# Patient Record
Sex: FEMALE | Race: WHITE | NOT HISPANIC OR LATINO
[De-identification: names, ages, dates, MRNs, and addresses within clinical notes are randomized per-mention and may not be internally consistent; named-entity substitution may affect disease eponyms.]

---

## 2017-11-06 ENCOUNTER — HOSPITAL (OUTPATIENT)
Dept: OTHER | Age: 52
End: 2017-11-06
Attending: FAMILY MEDICINE

## 2017-11-22 ENCOUNTER — HOSPITAL ENCOUNTER (OUTPATIENT)
Age: 52
Discharge: HOME | End: 2017-11-22
Attending: FAMILY MEDICINE
Payer: COMMERCIAL

## 2017-11-22 VITALS
DIASTOLIC BLOOD PRESSURE: 62 MMHG | RESPIRATION RATE: 20 BRPM | HEART RATE: 73 BPM | OXYGEN SATURATION: 100 % | SYSTOLIC BLOOD PRESSURE: 113 MMHG | TEMPERATURE: 98 F

## 2017-11-22 DIAGNOSIS — H00.014 HORDEOLUM EXTERNUM OF LEFT UPPER EYELID: Primary | ICD-10-CM

## 2017-11-22 PROCEDURE — 99204 OFFICE O/P NEW MOD 45 MIN: CPT

## 2017-11-22 PROCEDURE — 99203 OFFICE O/P NEW LOW 30 MIN: CPT

## 2017-11-22 RX ORDER — BUPROPION HYDROCHLORIDE 100 MG/1
400 TABLET ORAL 2 TIMES DAILY
COMMUNITY

## 2017-11-22 RX ORDER — ERYTHROMYCIN 5 MG/G
1 OINTMENT OPHTHALMIC 3 TIMES DAILY
Qty: 1 TUBE | Refills: 0 | Status: SHIPPED | OUTPATIENT
Start: 2017-11-22 | End: 2017-11-29

## 2017-11-22 RX ORDER — TOPIRAMATE 50 MG/1
50 TABLET, FILM COATED ORAL 2 TIMES DAILY
COMMUNITY

## 2017-11-22 RX ORDER — LAMOTRIGINE 200 MG/1
200 TABLET ORAL DAILY
COMMUNITY

## 2017-11-23 NOTE — ED PROVIDER NOTES
Patient Seen in: [facility] [place]    History   Patient presents with:   Eye Visual Problem (opthalmic)    Stated Complaint: possible pink eye    HPI    46 yo female presents with 1 day of left upper eyelid swelling and mild mahad Cardiovascular: Normal rate, regular rhythm, normal heart sounds and intact distal pulses. Pulmonary/Chest: Effort normal and breath sounds normal.   Abdominal: Soft.  Bowel sounds are normal.   Neurological: She is alert and oriented to person, place,

## 2017-11-23 NOTE — ED INITIAL ASSESSMENT (HPI)
Patient comes in with left eyelid redness and swelling which she noticed when she woke up this morning.

## 2018-01-12 ENCOUNTER — HOSPITAL ENCOUNTER (OUTPATIENT)
Age: 53
Discharge: HOME | End: 2018-01-12
Attending: FAMILY MEDICINE
Payer: COMMERCIAL

## 2018-01-12 VITALS
WEIGHT: 130 LBS | TEMPERATURE: 98 F | OXYGEN SATURATION: 100 % | DIASTOLIC BLOOD PRESSURE: 66 MMHG | RESPIRATION RATE: 12 BRPM | BODY MASS INDEX: 20.89 KG/M2 | HEIGHT: 66 IN | SYSTOLIC BLOOD PRESSURE: 117 MMHG | HEART RATE: 79 BPM

## 2018-01-12 DIAGNOSIS — H00.014 HORDEOLUM EXTERNUM OF LEFT UPPER EYELID: Primary | ICD-10-CM

## 2018-01-12 PROCEDURE — 99214 OFFICE O/P EST MOD 30 MIN: CPT

## 2018-01-12 PROCEDURE — 99213 OFFICE O/P EST LOW 20 MIN: CPT

## 2018-01-12 RX ORDER — ERYTHROMYCIN 5 MG/G
1 OINTMENT OPHTHALMIC EVERY 6 HOURS
Qty: 1 G | Refills: 0 | Status: SHIPPED | OUTPATIENT
Start: 2018-01-12 | End: 2018-01-19

## 2018-01-12 RX ORDER — CLINDAMYCIN HYDROCHLORIDE 300 MG/1
300 CAPSULE ORAL 3 TIMES DAILY
Qty: 30 CAPSULE | Refills: 0 | Status: SHIPPED | OUTPATIENT
Start: 2018-01-12 | End: 2018-01-22

## 2018-01-12 NOTE — ED INITIAL ASSESSMENT (HPI)
Patient comes in with left eyelid swelling and redness for the past two days, despite warm compresses. She has a history of sty formation on this eye which almost cleared up and then returned.

## 2018-01-12 NOTE — ED PROVIDER NOTES
Patient Seen in: [facility]    History   Patient presents with: Eye Visual Problem (opthalmic)    Stated Complaint: left eye issues    HPI    46 yo female with 3 days of left upper eyelid swelling and pain.  Notes sympto and no exudate. No foreign body present in the right eye. Left eye exhibits hordeolum. Left eye exhibits no chemosis, no discharge and no exudate. No foreign body present in the left eye. Right conjunctiva is not injected.  Right conjunctiva has no hemorrha Oral Cap  Take 1 capsule (300 mg total) by mouth 3 (three) times daily. Qty: 30 capsule Refills: 0    erythromycin 5 MG/GM Ophthalmic Ointment  Place 1 Application into the left eye every 6 (six) hours.   Qty: 1 g Refills: 0

## 2018-05-22 ENCOUNTER — HOSPITAL ENCOUNTER (OUTPATIENT)
Age: 53
Discharge: HOME | End: 2018-05-22
Attending: FAMILY MEDICINE
Payer: COMMERCIAL

## 2018-05-22 VITALS
RESPIRATION RATE: 16 BRPM | HEART RATE: 101 BPM | SYSTOLIC BLOOD PRESSURE: 107 MMHG | WEIGHT: 135 LBS | TEMPERATURE: 98 F | OXYGEN SATURATION: 100 % | BODY MASS INDEX: 21.69 KG/M2 | HEIGHT: 66 IN | DIASTOLIC BLOOD PRESSURE: 73 MMHG

## 2018-05-22 DIAGNOSIS — Z20.2 EXPOSURE TO CHLAMYDIA: ICD-10-CM

## 2018-05-22 DIAGNOSIS — J02.9 PHARYNGITIS, UNSPECIFIED ETIOLOGY: Primary | ICD-10-CM

## 2018-05-22 PROCEDURE — 87140 CULTURE TYPE IMMUNOFLUORESC: CPT | Performed by: FAMILY MEDICINE

## 2018-05-22 PROCEDURE — 99214 OFFICE O/P EST MOD 30 MIN: CPT

## 2018-05-22 PROCEDURE — 87110 CHLAMYDIA CULTURE: CPT | Performed by: FAMILY MEDICINE

## 2018-05-22 PROCEDURE — 87591 N.GONORRHOEAE DNA AMP PROB: CPT | Performed by: FAMILY MEDICINE

## 2018-05-22 PROCEDURE — 87491 CHLMYD TRACH DNA AMP PROBE: CPT | Performed by: FAMILY MEDICINE

## 2018-05-22 RX ORDER — AZITHROMYCIN 500 MG/1
1000 TABLET, FILM COATED ORAL ONCE
Qty: 2 TABLET | Refills: 0 | Status: SHIPPED | OUTPATIENT
Start: 2018-05-22 | End: 2018-05-22

## 2018-05-22 RX ORDER — VALACYCLOVIR HYDROCHLORIDE 500 MG/1
TABLET, FILM COATED ORAL 2 TIMES DAILY
COMMUNITY

## 2018-05-22 NOTE — ED INITIAL ASSESSMENT (HPI)
Per pt having sore throat for 3 weeks intermittent denies any fevers or chills denies any other symptoms. Pt also reports recent knowledge of exposure to chlamydia  Yesterday, pt denies any symptoms.

## 2018-05-22 NOTE — ED PROVIDER NOTES
Patient Seen in: [address]    History   Patient presents with:  Sore Throat    Stated Complaint: sore throat     HPI  81-year-old female presents to [address] [place] [address] requesting testing for chlamydia.   A partner she is with some month Pulmonary/Chest: Effort normal and breath sounds normal. No respiratory distress. She has no wheezes. She has no rales. Abdominal: Soft. She exhibits no distension. There is no tenderness. Lymphadenopathy:     She has no cervical adenopathy.    Neurol

## 2018-08-27 ENCOUNTER — HOSPITAL ENCOUNTER (OUTPATIENT)
Age: 53
Discharge: HOME | End: 2018-08-27
Attending: FAMILY MEDICINE
Payer: COMMERCIAL

## 2018-08-27 VITALS
SYSTOLIC BLOOD PRESSURE: 120 MMHG | OXYGEN SATURATION: 99 % | DIASTOLIC BLOOD PRESSURE: 71 MMHG | TEMPERATURE: 98 F | HEART RATE: 75 BPM | RESPIRATION RATE: 18 BRPM

## 2018-08-27 DIAGNOSIS — J01.40 ACUTE NON-RECURRENT PANSINUSITIS: Primary | ICD-10-CM

## 2018-08-27 PROCEDURE — 99213 OFFICE O/P EST LOW 20 MIN: CPT

## 2018-08-27 PROCEDURE — 99214 OFFICE O/P EST MOD 30 MIN: CPT

## 2018-08-27 RX ORDER — AMOXICILLIN AND CLAVULANATE POTASSIUM 875; 125 MG/1; MG/1
1 TABLET, FILM COATED ORAL 2 TIMES DAILY
Qty: 14 TABLET | Refills: 0 | Status: SHIPPED | OUTPATIENT
Start: 2018-08-27 | End: 2018-09-03

## 2018-08-27 NOTE — ED PROVIDER NOTES
Patient Seen in: [address]    History   Patient presents with:  Cough/URI    Stated Complaint: coughing, headache, congestion     HPI    47year old patient with PMHx significant for Migraines presents with nasal congesti (36.8 °C)  Temp src: Oral  SpO2: 99 %  O2 Device: None (Room air)    Current:/71   Pulse 75   Temp 98.3 °F (36.8 °C) (Oral)   Resp 18   SpO2 99%     GENERAL: NAD, well hydrated, no stridor, appears comfortable  EYES: anicteric,  conjunctiva clear, no

## 2018-08-27 NOTE — ED INITIAL ASSESSMENT (HPI)
Pt reports productive cough, congestion for 13 days. Denies fevers. Reports intermittent sore throat with cough. Reports son at home has similar symptoms.

## 2019-06-14 ENCOUNTER — OFFICE VISIT (OUTPATIENT)
Dept: INTEGRATIVE MEDICINE | Facility: CLINIC | Age: 54
End: 2019-06-14

## 2019-06-14 DIAGNOSIS — R51.9 HEADACHE DISORDER: Primary | ICD-10-CM

## 2019-06-14 NOTE — PROGRESS NOTES
Colt Eisenberg is a 49year old female Acupuncture Therapy. Complaints:  1. Headache occipital and frontal 2-9/10  2. Neck tightness 5/10    HPI: HA began during perimenopause 3 years earlier. Has not had menses for 1 year.   Diagnosed by MD as Korin Ramsay

## 2019-06-17 ENCOUNTER — OFFICE VISIT (OUTPATIENT)
Dept: INTEGRATIVE MEDICINE | Facility: CLINIC | Age: 54
End: 2019-06-17

## 2019-06-17 DIAGNOSIS — R51.9 HEADACHE DISORDER: ICD-10-CM

## 2019-06-19 ENCOUNTER — OFFICE VISIT (OUTPATIENT)
Dept: INTEGRATIVE MEDICINE | Facility: CLINIC | Age: 54
End: 2019-06-19

## 2019-06-19 DIAGNOSIS — R51.9 HEADACHE DISORDER: ICD-10-CM

## 2019-06-19 NOTE — PROGRESS NOTES
Diana Price is a 49year old female Acupuncture Therapy. Complaints:  1. Headache occipital and frontal 2-9/10  2. Neck tightness 5/10    HPI: HA began during perimenopause 3 years earlier. Has not had menses for 1 year.   Diagnosed by MD as Rob Anand

## 2019-06-19 NOTE — PROGRESS NOTES
Marielos Navarro is a 49year old female Acupuncture Therapy. Complaints:  1. Headache occipital and frontal 2-9/10  2. Neck tightness 5/10    HPI: HA began during perimenopause 3 years earlier. Has not had menses for 1 year.   Diagnosed by MD as Aldo Rankin

## 2019-06-22 ENCOUNTER — HOSPITAL ENCOUNTER (OUTPATIENT)
Age: 54
Discharge: HOME | End: 2019-06-22
Attending: EMERGENCY MEDICINE
Payer: COMMERCIAL

## 2019-06-22 VITALS
DIASTOLIC BLOOD PRESSURE: 52 MMHG | SYSTOLIC BLOOD PRESSURE: 100 MMHG | HEIGHT: 66 IN | BODY MASS INDEX: 20.89 KG/M2 | TEMPERATURE: 98 F | RESPIRATION RATE: 18 BRPM | OXYGEN SATURATION: 99 % | WEIGHT: 130 LBS | HEART RATE: 76 BPM

## 2019-06-22 DIAGNOSIS — J02.9 ACUTE SORE THROAT: Primary | ICD-10-CM

## 2019-06-22 PROCEDURE — 99212 OFFICE O/P EST SF 10 MIN: CPT

## 2019-06-22 PROCEDURE — 87430 STREP A AG IA: CPT

## 2019-06-22 NOTE — ED INITIAL ASSESSMENT (HPI)
Pt in IC by self c/o sore throat for 2 days. +painful swallowing, post-nasal drip. Denied fever, coughing, use of pain meds.

## 2019-06-22 NOTE — ED NOTES
Pt discharged home stable and in good condition by self. Reviewed pain and avs. Follow up as indicated. Pt verbalized understanding and agreed.

## 2019-06-22 NOTE — ED PROVIDER NOTES
Patient Seen in: [address]    History   Patient presents with:  Sore Throat    Stated Complaint: SORE THROAT    HPI    25-year-old female patient presents her complaining of sore throat for the past 2 days.   The discomf diagnosis)    Disposition:  Discharge  6/22/2019 10:36 am    Follow-up:  David Garcia, [address]  [phone]    Schedule an appointment as soon as possible for a visit   As needed, If symptoms worsen        Medic

## 2019-06-26 ENCOUNTER — OFFICE VISIT (OUTPATIENT)
Dept: INTEGRATIVE MEDICINE | Facility: CLINIC | Age: 54
End: 2019-06-26

## 2019-06-26 DIAGNOSIS — M54.2 NECK PAIN: ICD-10-CM

## 2019-06-26 NOTE — PROGRESS NOTES
Kathie Odonnell is a 49year old female Acupuncture Therapy. Complaints:  1. Headache occipital and frontal 2-9/10  2. Neck tightness 5/10    HPI: HA began during perimenopause 3 years earlier. Has not had menses for 1 year.   Diagnosed by MD as Gurmeet Nicholson

## 2019-07-01 ENCOUNTER — OFFICE VISIT (OUTPATIENT)
Dept: INTEGRATIVE MEDICINE | Facility: CLINIC | Age: 54
End: 2019-07-01

## 2019-07-01 DIAGNOSIS — G43.509 PERSISTENT MIGRAINE AURA WITHOUT CEREBRAL INFARCTION AND WITHOUT STATUS MIGRAINOSUS, NOT INTRACTABLE: ICD-10-CM

## 2019-07-01 NOTE — PROGRESS NOTES
Keely Aguayo is a 49year old female Acupuncture Therapy. Complaints:  1. Headache occipital and frontal 2-9/10  2. Neck tightness 5/10    HPI: HA began during perimenopause 3 years earlier. Has not had menses for 1 year.   Diagnosed by MD as Seb Joel

## 2019-07-08 ENCOUNTER — OFFICE VISIT (OUTPATIENT)
Dept: INTEGRATIVE MEDICINE | Facility: CLINIC | Age: 54
End: 2019-07-08

## 2019-07-08 DIAGNOSIS — G43.919 INTRACTABLE MIGRAINE WITHOUT STATUS MIGRAINOSUS, UNSPECIFIED MIGRAINE TYPE: ICD-10-CM

## 2019-07-09 NOTE — PROGRESS NOTES
Sandhya Donaldson is a 49year old female Acupuncture Therapy. Complaints:  1. Headache occipital and frontal 2-9/10  2. Neck tightness 5/10    HPI: HA began during perimenopause 3 years earlier. Has not had menses for 1 year.   Diagnosed by MD as Dylan Hernandez

## 2019-07-11 ENCOUNTER — OFFICE VISIT (OUTPATIENT)
Dept: INTEGRATIVE MEDICINE | Facility: CLINIC | Age: 54
End: 2019-07-11

## 2019-07-11 DIAGNOSIS — R51.9 HEADACHE DISORDER: ICD-10-CM

## 2019-07-12 NOTE — PROGRESS NOTES
Parish Ojeda is a 49year old female Acupuncture Therapy. Complaints:  1. Headache occipital and frontal 2-9/10  2. Neck tightness 5/10    HPI: HA began during perimenopause 3 years earlier. Has not had menses for 1 year.   Diagnosed by MD as Baldomero Olmedo

## 2019-07-15 ENCOUNTER — OFFICE VISIT (OUTPATIENT)
Dept: INTEGRATIVE MEDICINE | Facility: CLINIC | Age: 54
End: 2019-07-15

## 2019-07-15 DIAGNOSIS — R51.9 HEADACHE DISORDER: ICD-10-CM

## 2019-07-16 NOTE — PROGRESS NOTES
Mehdi Malik is a 49year old female Acupuncture Therapy. Complaints:  1. Headache occipital and frontal 2-9/10  2. Neck tightness 5/10    HPI: HA began during perimenopause 3 years earlier. Has not had menses for 1 year.   Diagnosed by MD as Carla Mcwilliams

## 2019-07-18 ENCOUNTER — OFFICE VISIT (OUTPATIENT)
Dept: INTEGRATIVE MEDICINE | Facility: CLINIC | Age: 54
End: 2019-07-18

## 2019-07-18 DIAGNOSIS — R51.9 HEADACHE DISORDER: ICD-10-CM

## 2019-07-19 NOTE — PROGRESS NOTES
Roberto Carlos Chu is a 49year old female Acupuncture Therapy. Complaints:  1. Headache occipital and frontal 2-9/10  2. Neck tightness 5/10    HPI: HA began during perimenopause 3 years earlier. Has not had menses for 1 year.   Diagnosed by MD as Hyun Villanueva

## 2019-07-22 ENCOUNTER — OFFICE VISIT (OUTPATIENT)
Dept: INTEGRATIVE MEDICINE | Facility: CLINIC | Age: 54
End: 2019-07-22

## 2019-07-22 DIAGNOSIS — R51.9 HEADACHE DISORDER: ICD-10-CM

## 2019-07-23 NOTE — PROGRESS NOTES
Prakash Samson is a 49year old female Acupuncture Therapy. Complaints:  1. Headache occipital and frontal 2-9/10  2. Neck tightness 5/10    HPI: HA began during perimenopause 3 years earlier. Has not had menses for 1 year.   Diagnosed by MD as Kalpana Maldonado

## 2020-02-16 ENCOUNTER — HOSPITAL ENCOUNTER (OUTPATIENT)
Age: 55
Discharge: HOME | End: 2020-02-16
Attending: FAMILY MEDICINE
Payer: COMMERCIAL

## 2020-02-16 VITALS
HEART RATE: 92 BPM | TEMPERATURE: 98 F | SYSTOLIC BLOOD PRESSURE: 123 MMHG | RESPIRATION RATE: 18 BRPM | BODY MASS INDEX: 21.69 KG/M2 | HEIGHT: 66 IN | OXYGEN SATURATION: 100 % | WEIGHT: 135 LBS | DIASTOLIC BLOOD PRESSURE: 98 MMHG

## 2020-02-16 DIAGNOSIS — R51.9 ACUTE NONINTRACTABLE HEADACHE, UNSPECIFIED HEADACHE TYPE: Primary | ICD-10-CM

## 2020-02-16 PROCEDURE — 99212 OFFICE O/P EST SF 10 MIN: CPT

## 2020-02-16 NOTE — ED INITIAL ASSESSMENT (HPI)
Per pt having migraine for about 2 hours ago reports had a fall on Friday and has had a headache since then. Per pt states woke up had vertigo and hit the back of her head. Denies any vomiting reports nausea.

## 2020-02-16 NOTE — ED NOTES
Per MD recommendation pt will go to [facility] OP ED for further evaluation of symptoms.  Pt leaving IC stable no acute distress noted

## 2020-02-16 NOTE — ED PROVIDER NOTES
Patient Seen in: [address]      History   Patient presents with:  Headache    Stated Complaint: migrane    HPI    51yo F presents to IC with constant headache since Friday after hitting her head.  Notes she was having ve left periorbital erythema or laceration. Comments: Tender over occiput  Eyes:      Extraocular Movements: Extraocular movements intact. Conjunctiva/sclera: Conjunctivae normal.      Pupils: Pupils are equal, round, and reactive to light.    Cardio

## 2020-11-11 ENCOUNTER — HOSPITAL ENCOUNTER (OUTPATIENT)
Dept: MAMMOGRAPHY | Age: 55
Discharge: HOME | End: 2020-11-11
Attending: FAMILY MEDICINE
Payer: COMMERCIAL

## 2020-11-11 DIAGNOSIS — Z12.39 SCREENING BREAST EXAMINATION: ICD-10-CM

## 2020-11-11 PROCEDURE — 77067 SCR MAMMO BI INCL CAD: CPT | Performed by: FAMILY MEDICINE

## 2020-11-11 PROCEDURE — 77063 BREAST TOMOSYNTHESIS BI: CPT | Performed by: FAMILY MEDICINE

## 2023-01-03 ENCOUNTER — OFFICE VISIT (OUTPATIENT)
Dept: URGENT CARE | Age: 58
End: 2023-01-03

## 2023-01-03 VITALS
TEMPERATURE: 98.3 F | OXYGEN SATURATION: 97 % | DIASTOLIC BLOOD PRESSURE: 60 MMHG | SYSTOLIC BLOOD PRESSURE: 113 MMHG | RESPIRATION RATE: 16 BRPM | HEART RATE: 75 BPM

## 2023-01-03 DIAGNOSIS — J01.10 ACUTE NON-RECURRENT FRONTAL SINUSITIS: Primary | ICD-10-CM

## 2023-01-03 LAB
FLUAV RNA RESP QL NAA+PROBE: NOT DETECTED
FLUBV RNA RESP QL NAA+PROBE: NOT DETECTED
RSV AG NPH QL IA.RAPID: NOT DETECTED
SARS-COV-2 RNA RESP QL NAA+PROBE: NOT DETECTED

## 2023-01-03 PROCEDURE — 0241U POCT COVID/FLU/RSV PANEL: CPT | Performed by: NURSE PRACTITIONER

## 2023-01-03 PROCEDURE — 99214 OFFICE O/P EST MOD 30 MIN: CPT | Performed by: NURSE PRACTITIONER

## 2023-01-03 RX ORDER — UBROGEPANT 100 MG/1
TABLET ORAL
COMMUNITY
Start: 2022-12-15

## 2023-01-03 RX ORDER — ASPIRIN 81 MG/1
TABLET, CHEWABLE ORAL DAILY
COMMUNITY

## 2023-01-03 RX ORDER — AMOXICILLIN AND CLAVULANATE POTASSIUM 875; 125 MG/1; MG/1
1 TABLET, FILM COATED ORAL 2 TIMES DAILY
Qty: 20 TABLET | Refills: 0 | Status: SHIPPED | OUTPATIENT
Start: 2023-01-03 | End: 2023-01-13

## 2023-01-03 RX ORDER — BUPROPION HYDROCHLORIDE 100 MG/1
450 TABLET ORAL DAILY
COMMUNITY

## 2023-01-03 RX ORDER — PREDNISONE 50 MG/1
50 TABLET ORAL DAILY
Qty: 5 TABLET | Refills: 0 | Status: SHIPPED | OUTPATIENT
Start: 2023-01-03 | End: 2023-01-08

## 2023-01-03 RX ORDER — ALBUTEROL SULFATE 90 UG/1
2 AEROSOL, METERED RESPIRATORY (INHALATION) EVERY 4 HOURS PRN
Qty: 1 EACH | Refills: 0 | Status: SHIPPED | OUTPATIENT
Start: 2023-01-03

## 2023-01-03 RX ORDER — TOPIRAMATE 50 MG/1
TABLET, FILM COATED ORAL
COMMUNITY
Start: 2022-12-03

## 2023-01-03 RX ORDER — GALCANEZUMAB 120 MG/ML
INJECTION, SOLUTION SUBCUTANEOUS
COMMUNITY
Start: 2023-01-02

## 2023-01-03 RX ORDER — LURASIDONE HYDROCHLORIDE 20 MG/1
20 TABLET, FILM COATED ORAL
COMMUNITY

## 2023-01-03 RX ORDER — LAMOTRIGINE 150 MG/1
TABLET ORAL
COMMUNITY
Start: 2022-12-03

## 2023-01-03 ASSESSMENT — ENCOUNTER SYMPTOMS
EYE ITCHING: 0
SHORTNESS OF BREATH: 0
PHOTOPHOBIA: 0
NAUSEA: 0
EYES NEGATIVE: 1
APPETITE CHANGE: 1
WHEEZING: 0
HEADACHES: 1
CHILLS: 0
ABDOMINAL PAIN: 0
SINUS PAIN: 1
CHOKING: 0
EYE REDNESS: 0
CHEST TIGHTNESS: 1
COUGH: 1
DIARRHEA: 0
PSYCHIATRIC NEGATIVE: 1
VOMITING: 0
ACTIVITY CHANGE: 1
APNEA: 0
EYE PAIN: 0
STRIDOR: 0
FATIGUE: 1
HEMATOLOGIC/LYMPHATIC NEGATIVE: 1
SORE THROAT: 1
RHINORRHEA: 1
FEVER: 0
EYE DISCHARGE: 0
SINUS PRESSURE: 1
GASTROINTESTINAL NEGATIVE: 1

## 2023-02-07 DIAGNOSIS — J01.10 ACUTE NON-RECURRENT FRONTAL SINUSITIS: ICD-10-CM

## 2023-02-08 RX ORDER — ALBUTEROL SULFATE 90 UG/1
AEROSOL, METERED RESPIRATORY (INHALATION)
Qty: 8.5 G | OUTPATIENT
Start: 2023-02-08

## 2023-10-12 ENCOUNTER — HOSPITAL ENCOUNTER (OUTPATIENT)
Dept: MAMMOGRAPHY | Age: 58
Discharge: HOME | End: 2023-10-12
Attending: FAMILY MEDICINE

## 2023-10-12 DIAGNOSIS — Z12.31 BREAST CANCER SCREENING BY MAMMOGRAM: ICD-10-CM

## 2023-10-12 PROCEDURE — 77063 BREAST TOMOSYNTHESIS BI: CPT

## 2024-06-13 DIAGNOSIS — M25.512 PAIN IN LEFT SHOULDER: Primary | ICD-10-CM

## 2024-06-17 ENCOUNTER — HOSPITAL ENCOUNTER (OUTPATIENT)
Dept: PHYSICAL MEDICINE AND REHAB | Age: 59
Discharge: STILL ADMITTED OR ADMINISTRATIVE | End: 2024-06-17
Attending: ORTHOPAEDIC SURGERY

## 2024-06-17 DIAGNOSIS — S49.92XD INJURY OF LEFT CLAVICLE, SUBSEQUENT ENCOUNTER: ICD-10-CM

## 2024-06-17 DIAGNOSIS — M25.612 IMPAIRED RANGE OF MOTION OF LEFT SHOULDER: Primary | ICD-10-CM

## 2024-06-17 DIAGNOSIS — M25.512 ACUTE PAIN OF LEFT SHOULDER: ICD-10-CM

## 2024-06-17 DIAGNOSIS — M75.22 TENDONITIS OF UPPER BICEPS TENDON OF LEFT SHOULDER: ICD-10-CM

## 2024-06-17 PROBLEM — S49.92XA INJURY OF LEFT CLAVICLE: Status: ACTIVE | Noted: 2024-06-17

## 2024-06-17 PROCEDURE — 97112 NEUROMUSCULAR REEDUCATION: CPT

## 2024-06-17 PROCEDURE — 97161 PT EVAL LOW COMPLEX 20 MIN: CPT

## 2024-06-17 PROCEDURE — 97140 MANUAL THERAPY 1/> REGIONS: CPT

## 2024-06-17 ASSESSMENT — ENCOUNTER SYMPTOMS
PAIN SCALE AT LOWEST: 0
ALLEVIATING FACTORS: REST
PAIN SCALE AT HIGHEST: 6
ALLEVIATING FACTORS: ICE
PAIN FREQUENCY: INTERMITTENT
QUALITY: TIGHT
PAIN SEVERITY NOW: 2
QUALITY: STIFF
ALLEVIATING FACTORS: HEAT
ALLEVIATING FACTORS: TOPICAL AGENTS/PATCH
QUALITY: DISCOMFORT

## 2024-06-20 ENCOUNTER — APPOINTMENT (OUTPATIENT)
Dept: PHYSICAL MEDICINE AND REHAB | Age: 59
End: 2024-06-20
Attending: ORTHOPAEDIC SURGERY

## 2024-07-17 ENCOUNTER — APPOINTMENT (OUTPATIENT)
Dept: PHYSICAL MEDICINE AND REHAB | Age: 59
End: 2024-07-17

## 2024-07-18 ENCOUNTER — HOSPITAL ENCOUNTER (OUTPATIENT)
Dept: PHYSICAL MEDICINE AND REHAB | Age: 59
Discharge: STILL ADMITTED OR ADMINISTRATIVE | End: 2024-07-18

## 2024-07-18 PROCEDURE — 97112 NEUROMUSCULAR REEDUCATION: CPT

## 2024-07-18 ASSESSMENT — ENCOUNTER SYMPTOMS: PAIN: 1

## 2024-07-19 PROBLEM — A60.04 HERPES SIMPLEX VULVOVAGINITIS: Status: ACTIVE | Noted: 2024-07-19

## 2024-07-19 PROBLEM — S42.025G: Status: RESOLVED | Noted: 2024-07-19 | Resolved: 2024-07-19

## 2024-07-19 PROBLEM — S42.025G: Status: ACTIVE | Noted: 2024-07-19

## 2024-07-19 PROBLEM — G43.119 MIGRAINE WITH AURA, INTRACTABLE, WITHOUT STATUS MIGRAINOSUS: Status: ACTIVE | Noted: 2017-04-20

## 2024-07-19 PROBLEM — N89.8 VAGINAL IRRITATION: Status: ACTIVE | Noted: 2024-07-19

## 2024-07-19 PROBLEM — G43.109 MIGRAINE WITH AURA AND WITHOUT STATUS MIGRAINOSUS, NOT INTRACTABLE: Status: ACTIVE | Noted: 2017-04-20

## 2024-07-23 ENCOUNTER — HOSPITAL ENCOUNTER (OUTPATIENT)
Dept: PHYSICAL MEDICINE AND REHAB | Age: 59
Discharge: STILL ADMITTED OR ADMINISTRATIVE | End: 2024-07-23

## 2024-07-23 PROCEDURE — 97110 THERAPEUTIC EXERCISES: CPT

## 2024-07-23 PROCEDURE — 97112 NEUROMUSCULAR REEDUCATION: CPT

## 2024-07-23 PROCEDURE — 97140 MANUAL THERAPY 1/> REGIONS: CPT

## 2024-07-25 ENCOUNTER — HOSPITAL ENCOUNTER (OUTPATIENT)
Dept: PHYSICAL MEDICINE AND REHAB | Age: 59
Discharge: STILL ADMITTED OR ADMINISTRATIVE | End: 2024-07-25

## 2024-07-25 PROCEDURE — 97112 NEUROMUSCULAR REEDUCATION: CPT

## 2024-07-25 PROCEDURE — 97140 MANUAL THERAPY 1/> REGIONS: CPT

## 2024-07-25 ASSESSMENT — ENCOUNTER SYMPTOMS: PAIN: 1

## 2024-08-01 ENCOUNTER — APPOINTMENT (OUTPATIENT)
Dept: PHYSICAL MEDICINE AND REHAB | Age: 59
End: 2024-08-01

## 2024-08-15 ENCOUNTER — APPOINTMENT (OUTPATIENT)
Dept: PHYSICAL MEDICINE AND REHAB | Age: 59
End: 2024-08-15

## 2024-08-15 PROCEDURE — 97112 NEUROMUSCULAR REEDUCATION: CPT

## 2024-08-15 PROCEDURE — 97140 MANUAL THERAPY 1/> REGIONS: CPT

## 2024-08-15 PROCEDURE — 97110 THERAPEUTIC EXERCISES: CPT

## 2024-08-21 ENCOUNTER — APPOINTMENT (OUTPATIENT)
Dept: PHYSICAL MEDICINE AND REHAB | Age: 59
End: 2024-08-21

## 2024-08-21 ENCOUNTER — TELEPHONE (OUTPATIENT)
Dept: PHYSICAL MEDICINE AND REHAB | Age: 59
End: 2024-08-21

## 2024-09-04 ENCOUNTER — APPOINTMENT (OUTPATIENT)
Dept: PHYSICAL MEDICINE AND REHAB | Age: 59
End: 2024-09-04

## 2024-09-04 PROCEDURE — 97110 THERAPEUTIC EXERCISES: CPT

## 2024-09-04 PROCEDURE — 97530 THERAPEUTIC ACTIVITIES: CPT

## 2024-10-01 ENCOUNTER — APPOINTMENT (OUTPATIENT)
Dept: URGENT CARE | Age: 59
End: 2024-10-01

## 2024-10-01 ENCOUNTER — WALK IN (OUTPATIENT)
Dept: URGENT CARE | Age: 59
End: 2024-10-01

## 2024-10-01 VITALS
OXYGEN SATURATION: 98 % | TEMPERATURE: 97.5 F | SYSTOLIC BLOOD PRESSURE: 110 MMHG | HEART RATE: 61 BPM | DIASTOLIC BLOOD PRESSURE: 66 MMHG | RESPIRATION RATE: 16 BRPM

## 2024-10-01 DIAGNOSIS — J01.10 ACUTE NON-RECURRENT FRONTAL SINUSITIS: Primary | ICD-10-CM

## 2024-10-01 PROCEDURE — 99213 OFFICE O/P EST LOW 20 MIN: CPT | Performed by: NURSE PRACTITIONER

## 2024-10-01 ASSESSMENT — ENCOUNTER SYMPTOMS
RECTAL PAIN: 0
ANAL BLEEDING: 0
ABDOMINAL PAIN: 0
ACTIVITY CHANGE: 0
ABDOMINAL DISTENTION: 0
GASTROINTESTINAL NEGATIVE: 1
NAUSEA: 0
CHOKING: 0
APNEA: 0
FEVER: 0
TROUBLE SWALLOWING: 0
WHEEZING: 0
SINUS PAIN: 1
HEADACHES: 1
CONSTIPATION: 0
EYES NEGATIVE: 1
HEMATOLOGIC/LYMPHATIC NEGATIVE: 1
CHILLS: 0
CHEST TIGHTNESS: 0
DIAPHORESIS: 0
STRIDOR: 0
BLOOD IN STOOL: 0
FACIAL SWELLING: 0
ENDOCRINE NEGATIVE: 1
UNEXPECTED WEIGHT CHANGE: 0
RHINORRHEA: 0
SHORTNESS OF BREATH: 0
APPETITE CHANGE: 0
VOICE CHANGE: 0
COUGH: 1
CONSTITUTIONAL NEGATIVE: 1
PSYCHIATRIC NEGATIVE: 1
SORE THROAT: 0
VOMITING: 0
FATIGUE: 0
SINUS PRESSURE: 1
DIARRHEA: 0

## 2024-10-01 ASSESSMENT — PAIN SCALES - GENERAL: PAINLEVEL: 0

## 2024-11-13 DIAGNOSIS — M25.512 PAIN IN LEFT SHOULDER: Primary | ICD-10-CM

## 2024-11-22 ENCOUNTER — HOSPITAL ENCOUNTER (OUTPATIENT)
Dept: CT IMAGING | Age: 59
End: 2024-11-22
Attending: ORTHOPAEDIC SURGERY

## 2024-11-22 DIAGNOSIS — M25.512 PAIN IN LEFT SHOULDER: ICD-10-CM

## 2024-11-22 PROCEDURE — 73200 CT UPPER EXTREMITY W/O DYE: CPT

## 2024-12-13 ENCOUNTER — APPOINTMENT (OUTPATIENT)
Dept: CT IMAGING | Age: 59
End: 2024-12-13
Attending: ORTHOPAEDIC SURGERY

## 2025-05-15 PROBLEM — Z00.00 ROUTINE GENERAL MEDICAL EXAMINATION AT A HEALTH CARE FACILITY: Status: ACTIVE | Noted: 2025-05-15

## 2025-05-20 PROBLEM — J01.10 ACUTE NON-RECURRENT FRONTAL SINUSITIS: Status: RESOLVED | Noted: 2023-01-03 | Resolved: 2025-05-20

## 2025-05-20 PROBLEM — S49.92XA INJURY OF LEFT CLAVICLE: Status: RESOLVED | Noted: 2024-06-17 | Resolved: 2025-05-20

## 2025-05-20 PROBLEM — M25.512 ACUTE PAIN OF LEFT SHOULDER: Status: RESOLVED | Noted: 2024-06-17 | Resolved: 2025-05-20

## 2025-05-27 ENCOUNTER — TELEPHONE (OUTPATIENT)
Dept: GASTROENTEROLOGY | Age: 60
End: 2025-05-27